# Patient Record
Sex: MALE | Race: WHITE | NOT HISPANIC OR LATINO | Employment: OTHER | URBAN - NONMETROPOLITAN AREA
[De-identification: names, ages, dates, MRNs, and addresses within clinical notes are randomized per-mention and may not be internally consistent; named-entity substitution may affect disease eponyms.]

---

## 2017-03-07 ENCOUNTER — OFFICE VISIT (OUTPATIENT)
Dept: CARDIOLOGY | Facility: CLINIC | Age: 72
End: 2017-03-07

## 2017-03-07 VITALS
SYSTOLIC BLOOD PRESSURE: 112 MMHG | BODY MASS INDEX: 35.76 KG/M2 | WEIGHT: 264 LBS | HEIGHT: 72 IN | HEART RATE: 68 BPM | DIASTOLIC BLOOD PRESSURE: 58 MMHG

## 2017-03-07 DIAGNOSIS — E11.9 TYPE 2 DIABETES MELLITUS WITHOUT COMPLICATION, WITHOUT LONG-TERM CURRENT USE OF INSULIN (HCC): ICD-10-CM

## 2017-03-07 DIAGNOSIS — J44.9 CHRONIC OBSTRUCTIVE PULMONARY DISEASE, UNSPECIFIED COPD TYPE (HCC): ICD-10-CM

## 2017-03-07 DIAGNOSIS — G47.30 SLEEP APNEA IN ADULT: ICD-10-CM

## 2017-03-07 DIAGNOSIS — I10 ESSENTIAL HYPERTENSION: Primary | ICD-10-CM

## 2017-03-07 DIAGNOSIS — E78.2 MIXED HYPERLIPIDEMIA: ICD-10-CM

## 2017-03-07 PROCEDURE — 99213 OFFICE O/P EST LOW 20 MIN: CPT | Performed by: NURSE PRACTITIONER

## 2017-03-07 NOTE — PROGRESS NOTES
Chief Complaint   Patient presents with   • Follow-up     He states has had one episode of chest pain, states related to lungs. PCP obtains labs about every 6 months. PCP writes refills on medication.   • Dizziness     Only if he gets up to fast. Has some swelling to face and left leg from time to time.       Subjective       Chet Beck is a 71 y.o. male  with a history of hypertension, COPD, and sleep apnea who had an abnormal echocardiogram being managed conservatively. At last few visits, repeat cardiac workup advised but patient declined as no new symptoms reported. Today he returns to the office for a follow up appointment and denies angina or palpitations. Some increase in shortness of breath and decreased effort tolerance reported he attributes to his lung disease. He is using daily oxygen.     HPI       Cardiac History:    Past Surgical History   Procedure Laterality Date   • Echo - converted  02/07/2012     EF 55% anterior WMA       Current Outpatient Prescriptions   Medication Sig Dispense Refill   • albuterol (PROVENTIL) (5 MG/ML) 0.5% nebulizer solution Take 2.5 mg by nebulization every 6 (six) hours as needed for wheezing.     • allopurinol (ZYLOPRIM) 300 MG tablet Take 300 mg by mouth daily.     • ALPRAZolam (XANAX) 1 MG tablet Take 1 mg by mouth 2 (two) times a day.     • atorvastatin (LIPITOR) 40 MG tablet Take 40 mg by mouth daily.     • cholecalciferol (VITAMIN D3) 1000 UNITS tablet Take 1,000 Units by mouth daily.     • fluticasone-salmeterol (ADVAIR) 500-50 MCG/DOSE DISKUS Inhale 2 (two) times a day.     • furosemide (LASIX) 40 MG tablet Take 40 mg by mouth Daily.     • HYDROcodone-acetaminophen (NORCO) 7.5-325 MG per tablet Take 1 tablet by mouth 3 (three) times a day as needed for moderate pain (4-6).     • isosorbide mononitrate (IMDUR) 60 MG 24 hr tablet Take 60 mg by mouth daily.     • metFORMIN XR (GLUCOPHAGE-XR) 500 MG 24 hr tablet 1/2 tablet by mouth once daily      • olmesartan  (BENICAR) 40 MG tablet Take 40 mg by mouth daily.     • omeprazole (PriLOSEC) 20 MG capsule Take 20 mg by mouth daily.     • OXYGEN-HELIUM IN Inhale. 2L continuous     • tamsulosin (FLOMAX) 0.4 MG capsule 24 hr capsule Take 1 capsule by mouth every night.     • tiotropium (SPIRIVA) 18 MCG per inhalation capsule Place 1 capsule into inhaler and inhale 1 (one) time daily.     • verapamil PM (VERELAN PM) 360 MG 24 hr capsule Take 360 mg by mouth every night.       No current facility-administered medications for this visit.        Codeine    Past Medical History   Diagnosis Date   • Anxiety    • Broken neck      Neck broke- 1982- fusion done.   • Colon abnormality      Colon (pockets) one started bleeding had to be cauterized- Dr. Baez.   • COPD (chronic obstructive pulmonary disease)      14-16 lung capacity left- Dr. Coleman following s/p O2 2L 24/7   • Diverticulitis      cauterized   • History of fusion of cervical spine    • Hyperlipidemia    • Hypertension    • Oxygen dependent      Has been on oxygen since 1998- quit smoking at that time   • Sleep apnea      CPAP   • Sleeping difficulties        Social History     Social History   • Marital status:      Spouse name: N/A   • Number of children: N/A   • Years of education: N/A     Occupational History   • Not on file.     Social History Main Topics   • Smoking status: Former Smoker     Quit date: 3/7/1997   • Smokeless tobacco: Never Used      Comment: quit several years ago   • Alcohol use No   • Drug use: No   • Sexual activity: Not on file     Other Topics Concern   • Not on file     Social History Narrative       Family History   Problem Relation Age of Onset   • COPD Mother    • Heart disease Father      Stent placement   • COPD Sister    • Heart attack Brother    • Hypertension Son        Review of Systems   Constitutional: Positive for activity change. Negative for appetite change and fever.   HENT: Negative.    Respiratory: Positive for cough and  "shortness of breath. Negative for choking, chest tightness and wheezing.    Cardiovascular: Positive for leg swelling (occassionally has lower leg edema).   Gastrointestinal: Negative.    Genitourinary: Negative.    Musculoskeletal: Positive for arthralgias. Negative for myalgias.   Neurological: Positive for dizziness (if get up to fast). Negative for facial asymmetry, speech difficulty and light-headedness.   Hematological: Does not bruise/bleed easily.   Psychiatric/Behavioral: Negative for confusion and sleep disturbance. The patient is not nervous/anxious.        Diabetes- Yes  Thyroid-normal    Objective     Visit Vitals   • /58 (BP Location: Left arm)   • Pulse 68   • Ht 72\" (182.9 cm)   • Wt 264 lb (120 kg)   • BMI 35.8 kg/m2       Physical Exam   Constitutional: He is oriented to person, place, and time.   Eyes: Pupils are equal, round, and reactive to light.   Neck: Neck supple. No JVD present.   Cardiovascular: Normal rate, regular rhythm and S1 normal.    Pulses:       Radial pulses are 2+ on the right side, and 2+ on the left side.   Slightly loud S2   Pulmonary/Chest: Effort normal. No respiratory distress. He has decreased breath sounds.   Abdominal: Soft. Bowel sounds are normal. He exhibits no distension. There is no tenderness.   Musculoskeletal: He exhibits no edema.   Neurological: He is alert and oriented to person, place, and time.   Skin: Skin is warm and dry. No pallor.   Psychiatric: His behavior is normal.   Vitals reviewed.      Procedures        Assessment/Plan      Chet was seen today for follow-up and dizziness.    Diagnoses and all orders for this visit:    Essential hypertension    Mixed hyperlipidemia    Chronic obstructive pulmonary disease, unspecified COPD type    Type 2 diabetes mellitus without complication, without long-term current use of insulin    Sleep apnea in adult        He follows with Dr. Coleman for pulmonary issues. From a cardiac standpoint he denies symptoms " and prefers to not have any cardiac testing unless symptoms or problems develop. His blood pressure and heart rate are normal. No medication changes recommended. His weight continues to increase and I encouraged him on decreased caloric intake. Activity is limited due to shortness of breath.   We will see him back in 6 months or sooner for problems.            Electronically signed by TONY Hernández,  March 7, 2017 1:20 PM

## 2017-09-11 ENCOUNTER — OFFICE VISIT (OUTPATIENT)
Dept: CARDIOLOGY | Facility: CLINIC | Age: 72
End: 2017-09-11

## 2017-09-11 VITALS
HEART RATE: 76 BPM | BODY MASS INDEX: 33.59 KG/M2 | WEIGHT: 248 LBS | SYSTOLIC BLOOD PRESSURE: 110 MMHG | DIASTOLIC BLOOD PRESSURE: 64 MMHG | HEIGHT: 72 IN

## 2017-09-11 DIAGNOSIS — I10 ESSENTIAL HYPERTENSION: ICD-10-CM

## 2017-09-11 DIAGNOSIS — E78.2 MIXED HYPERLIPIDEMIA: ICD-10-CM

## 2017-09-11 DIAGNOSIS — E11.9 TYPE 2 DIABETES MELLITUS WITHOUT COMPLICATION, WITHOUT LONG-TERM CURRENT USE OF INSULIN (HCC): ICD-10-CM

## 2017-09-11 DIAGNOSIS — R06.02 SHORTNESS OF BREATH: ICD-10-CM

## 2017-09-11 DIAGNOSIS — J44.9 COPD MIXED TYPE (HCC): Primary | ICD-10-CM

## 2017-09-11 PROCEDURE — 99213 OFFICE O/P EST LOW 20 MIN: CPT | Performed by: NURSE PRACTITIONER

## 2017-09-11 RX ORDER — ALPRAZOLAM 0.5 MG/1
0.5 TABLET ORAL 3 TIMES DAILY
COMMUNITY

## 2017-09-11 NOTE — PROGRESS NOTES
"Chief Complaint   Patient presents with   • Follow-up     cardiac management, labs per PCP, patient brought medication list with visit.    • Shortness of Breath     \"with activity and worse some days than other's\" wears oxygen 2 liters via NC continuously.       Cardiac Complaints  none      Subjective   Chet Beck is a 72 y.o. male with a history of hypertension, COPD, and sleep apnea who had an abnormal echocardiogram in 2012 being managed conservatively. At last few visits, repeat cardiac workup advised but patient has declined as no new symptoms reported. He returns today for follow up and denies any new concerns.  He does continue to have shortness of breath, but states it is no worse than prior, and attributes to his COPD.  He continues to wear 2LNC 24 hours a day and uses CPAP at night. Dr. Coleman is following in regards. Labs he reports with PCP and states he does not have report from most recent.  No refills are needed today as he reports with PCP.      Cardiac History  Past Surgical History:   Procedure Laterality Date   • ECHO - CONVERTED  02/07/2012    EF 55% anterior WMA       Current Outpatient Prescriptions   Medication Sig Dispense Refill   • albuterol (PROVENTIL) (5 MG/ML) 0.5% nebulizer solution Take 2.5 mg by nebulization every 6 (six) hours as needed for wheezing.     • allopurinol (ZYLOPRIM) 300 MG tablet Take 300 mg by mouth daily.     • ALPRAZolam (XANAX) 0.5 MG tablet Take 0.5 mg by mouth 3 (Three) Times a Day.     • atorvastatin (LIPITOR) 40 MG tablet Take 40 mg by mouth daily.     • cholecalciferol (VITAMIN D3) 1000 UNITS tablet Take 1,000 Units by mouth daily.     • fluticasone-salmeterol (ADVAIR) 500-50 MCG/DOSE DISKUS Inhale 2 (two) times a day.     • furosemide (LASIX) 40 MG tablet Take 40 mg by mouth Daily.     • HYDROcodone-acetaminophen (NORCO) 7.5-325 MG per tablet Take 1 tablet by mouth 3 (three) times a day as needed for moderate pain (4-6).     • isosorbide mononitrate (IMDUR) " 60 MG 24 hr tablet Take 60 mg by mouth daily.     • metFORMIN XR (GLUCOPHAGE-XR) 500 MG 24 hr tablet 1/2 tablet by mouth once daily      • olmesartan (BENICAR) 40 MG tablet Take 40 mg by mouth daily.     • omeprazole (PriLOSEC) 20 MG capsule Take 20 mg by mouth daily.     • OXYGEN-HELIUM IN Inhale. 2L continuous     • tamsulosin (FLOMAX) 0.4 MG capsule 24 hr capsule Take 1 capsule by mouth every night.     • tiotropium (SPIRIVA) 18 MCG per inhalation capsule Place 1 capsule into inhaler and inhale 1 (one) time daily.     • verapamil PM (VERELAN PM) 360 MG 24 hr capsule Take 360 mg by mouth every night.       No current facility-administered medications for this visit.        Codeine    Past Medical History:   Diagnosis Date   • Anxiety    • Broken neck     Neck broke- 1982- fusion done.   • Colon abnormality     Colon (pockets) one started bleeding had to be cauterized- Dr. Baez.   • COPD (chronic obstructive pulmonary disease)     14-16 lung capacity left- Dr. Coleman following s/p O2 2L 24/7   • Diverticulitis     cauterized   • History of fusion of cervical spine    • Hyperlipidemia    • Hypertension    • Oxygen dependent     Has been on oxygen since 1998- quit smoking at that time   • Sleep apnea     CPAP   • Sleeping difficulties        Social History     Social History   • Marital status:      Spouse name: N/A   • Number of children: N/A   • Years of education: N/A     Occupational History   • Not on file.     Social History Main Topics   • Smoking status: Former Smoker     Quit date: 3/7/1997   • Smokeless tobacco: Never Used      Comment: quit several years ago   • Alcohol use No   • Drug use: No   • Sexual activity: Not on file     Other Topics Concern   • Not on file     Social History Narrative       Family History   Problem Relation Age of Onset   • COPD Mother    • Heart disease Father      Stent placement   • COPD Sister    • Heart attack Brother    • Hypertension Son        Review of Systems  "  Constitution: Negative for weakness and malaise/fatigue.   Cardiovascular: Negative for chest pain, dyspnea on exertion, irregular heartbeat, near-syncope, palpitations and syncope.   Respiratory: Negative for shortness of breath and wheezing.    Musculoskeletal: Negative for arthritis and back pain.   Gastrointestinal: Negative for anorexia, heartburn and nausea.   Genitourinary: Negative for dysuria, hematuria, hesitancy and nocturia.   Neurological: Negative for dizziness, focal weakness and light-headedness.   Psychiatric/Behavioral: Negative for altered mental status and depression.       DiabetesYes  Thyroidnormal    Objective     /64 (BP Location: Left arm)  Pulse 76  Ht 72\" (182.9 cm)  Wt 248 lb (112 kg)  BMI 33.63 kg/m2    Physical Exam   Constitutional: He is oriented to person, place, and time. He appears well-developed and well-nourished.   HENT:   Head: Normocephalic and atraumatic.   Eyes: EOM are normal. Pupils are equal, round, and reactive to light.   Neck: Normal range of motion. Neck supple.   Cardiovascular: Normal rate and regular rhythm.    Pulmonary/Chest: Effort normal. He has wheezes.   Abdominal: Soft. Bowel sounds are normal.   Musculoskeletal: Normal range of motion.   Neurological: He is alert and oriented to person, place, and time.   Skin: Skin is warm and dry.   Psychiatric: He has a normal mood and affect. His behavior is normal.       Procedures    Assessment/Plan     HR and BP are both stable today.  No changes to current medication regimen will be advised.  We did discuss repeat current cardiac testing with patient since he continues to have shortness of breath but he declines as he feels like this is stable. Labs are done with your office, could I get most recent copy for our records?  No refills are needed as they are filled with your office.  He was encouraged to continue use of oxygen 2LNC daily and uses his CPAP with oxygen at night, he follows with Dr. Coleman in " regards.  Good cardiac diet with activity as tolerated advised.  He was praised for recent weight loss efforts as he has changed his dietary habits and is trying to eat earlier in the day.  He has lost about 15 pounds since last visit.  6 month follow up advised with patient advised to call with concerns if appointment should be needed.      Problems Addressed this Visit        Cardiovascular and Mediastinum    HTN (hypertension)    Hyperlipidemia       Endocrine    Diabetes      Other Visit Diagnoses     COPD mixed type    -  Primary    Shortness of breath                      Electronically signed by TONY Liang September 11, 2017 4:20 PM

## 2018-01-01 ENCOUNTER — OFFICE VISIT (OUTPATIENT)
Dept: CARDIOLOGY | Facility: CLINIC | Age: 73
End: 2018-01-01

## 2018-01-01 VITALS
HEART RATE: 76 BPM | BODY MASS INDEX: 31.15 KG/M2 | SYSTOLIC BLOOD PRESSURE: 110 MMHG | WEIGHT: 230 LBS | DIASTOLIC BLOOD PRESSURE: 64 MMHG | HEIGHT: 72 IN

## 2018-01-01 VITALS
SYSTOLIC BLOOD PRESSURE: 122 MMHG | HEIGHT: 72 IN | HEART RATE: 80 BPM | WEIGHT: 239 LBS | DIASTOLIC BLOOD PRESSURE: 58 MMHG | BODY MASS INDEX: 32.37 KG/M2

## 2018-01-01 DIAGNOSIS — Z79.899 MEDICATION MANAGEMENT: ICD-10-CM

## 2018-01-01 DIAGNOSIS — E78.2 MIXED HYPERLIPIDEMIA: ICD-10-CM

## 2018-01-01 DIAGNOSIS — R06.02 SHORTNESS OF BREATH: ICD-10-CM

## 2018-01-01 DIAGNOSIS — J44.9 CHRONIC OBSTRUCTIVE PULMONARY DISEASE, UNSPECIFIED COPD TYPE (HCC): ICD-10-CM

## 2018-01-01 DIAGNOSIS — I10 ESSENTIAL HYPERTENSION: Primary | ICD-10-CM

## 2018-01-01 DIAGNOSIS — E11.9 TYPE 2 DIABETES MELLITUS WITHOUT COMPLICATION, WITHOUT LONG-TERM CURRENT USE OF INSULIN (HCC): ICD-10-CM

## 2018-01-01 PROCEDURE — 99213 OFFICE O/P EST LOW 20 MIN: CPT | Performed by: NURSE PRACTITIONER

## 2018-03-12 NOTE — PROGRESS NOTES
Chief Complaint   Patient presents with   • Follow-up     For cardiac management. Last labs 1/21/18 per PCP. PCP writes refills on medication.   • Shortness of Breath     He reports about the same, continues to wear O2 at 3L/M [facility] and wears CPAP. He follows with Dr Coleman.       Subjective       Chet Beck is a 72 y.o. male with a history of hypertension, COPD, and sleep apnea who had an abnormal echocardiogram in 2012 being managed conservatively. At last few visits, repeat cardiac workup has been recommended, but he has declined since no new symptoms reported.  He came in today for his follow up visit.  He continues to have shortness of breath but feels this is stable and unchanged.  He continues to wear 2LNC 24 hours a day and uses CPAP at night.  Lately he has increased to 3L when needed.  Dr. Coleman is following the COPD.  Labs have been managed with Dr. Baez with most recent check in January 2018.  He denies any chest pain.    HPI         Cardiac History:    Past Surgical History:   Procedure Laterality Date   • ECHO - CONVERTED  02/07/2012    EF 55% anterior WMA       Current Outpatient Prescriptions   Medication Sig Dispense Refill   • albuterol (PROVENTIL) (5 MG/ML) 0.5% nebulizer solution Take 2.5 mg by nebulization every 6 (six) hours as needed for wheezing.     • allopurinol (ZYLOPRIM) 300 MG tablet Take 300 mg by mouth daily.     • ALPRAZolam (XANAX) 0.5 MG tablet Take 0.5 mg by mouth 3 (Three) Times a Day.     • atorvastatin (LIPITOR) 40 MG tablet Take 40 mg by mouth daily.     • cholecalciferol (VITAMIN D3) 1000 UNITS tablet Take 1,000 Units by mouth daily.     • fluticasone-salmeterol (ADVAIR) 500-50 MCG/DOSE DISKUS Inhale 2 (two) times a day.     • furosemide (LASIX) 40 MG tablet Take 40 mg by mouth Daily.     • HYDROcodone-acetaminophen (NORCO) 7.5-325 MG per tablet Take 1 tablet by mouth 3 (three) times a day as needed for moderate pain (4-6).     • isosorbide mononitrate (IMDUR) 60 MG 24 hr  tablet Take 60 mg by mouth daily.     • metFORMIN XR (GLUCOPHAGE-XR) 500 MG 24 hr tablet 1/2 tablet by mouth once daily      • olmesartan (BENICAR) 40 MG tablet Take 40 mg by mouth daily.     • omeprazole (PriLOSEC) 20 MG capsule Take 20 mg by mouth daily.     • OXYGEN-HELIUM IN Inhale. 2L continuous     • tamsulosin (FLOMAX) 0.4 MG capsule 24 hr capsule Take 1 capsule by mouth every night.     • tiotropium (SPIRIVA) 18 MCG per inhalation capsule Place 1 capsule into inhaler and inhale 1 (one) time daily.     • verapamil PM (VERELAN PM) 360 MG 24 hr capsule Take 360 mg by mouth every night.       No current facility-administered medications for this visit.        Codeine    Past Medical History:   Diagnosis Date   • Anxiety    • Broken neck     Neck broke- 1982- fusion done.   • Colon abnormality     Colon (pockets) one started bleeding had to be cauterized- Dr. Baez.   • COPD (chronic obstructive pulmonary disease)     14-16 lung capacity left- Dr. Coleman following s/p O2 2L 24/7   • Diverticulitis     cauterized   • History of fusion of cervical spine    • Hyperlipidemia    • Hypertension    • Oxygen dependent     Has been on oxygen since 1998- quit smoking at that time   • Sleep apnea     CPAP   • Sleeping difficulties        Social History     Social History   • Marital status:      Spouse name: N/A   • Number of children: N/A   • Years of education: N/A     Occupational History   • Not on file.     Social History Main Topics   • Smoking status: Former Smoker     Quit date: 3/7/1997   • Smokeless tobacco: Never Used      Comment: quit several years ago   • Alcohol use No   • Drug use: No   • Sexual activity: Not on file     Other Topics Concern   • Not on file     Social History Narrative   • No narrative on file       Family History   Problem Relation Age of Onset   • COPD Mother    • Heart disease Father      Stent placement   • COPD Sister    • Heart attack Brother    • Hypertension Son        Review  "of Systems   Constitution: Positive for weakness and weight loss (9 lb). Negative for night sweats.   HENT: Negative.    Eyes: Negative.    Cardiovascular: Positive for dyspnea on exertion. Negative for chest pain, leg swelling, orthopnea, paroxysmal nocturnal dyspnea and syncope.   Respiratory: Positive for cough (occasional), shortness of breath and wheezing.    Endocrine: Negative.    Hematologic/Lymphatic: Negative for bleeding problem. Does not bruise/bleed easily.   Skin: Negative.    Musculoskeletal: Negative for falls and myalgias.   Gastrointestinal: Negative for abdominal pain, melena and nausea.   Genitourinary: Negative for dysuria and hematuria.   Neurological: Negative for dizziness.        Diabetes- Yes  Thyroid-normal, no recent labs available     Objective     /58 (BP Location: Right arm)   Pulse 80   Ht 182.9 cm (72.01\")   Wt 108 kg (239 lb)   BMI 32.41 kg/m²     Physical Exam   Constitutional: He is oriented to person, place, and time. He appears well-developed and well-nourished.   HENT:   Head: Normocephalic.   Eyes: Pupils are equal, round, and reactive to light.   Neck: Normal range of motion.   Cardiovascular: Normal rate, regular rhythm and intact distal pulses.    Murmur heard.  Pulmonary/Chest: Effort normal. No respiratory distress. He has decreased breath sounds. He has no wheezes.   Abdominal: Soft. Bowel sounds are normal.   Musculoskeletal: Normal range of motion. He exhibits no edema.   Neurological: He is alert and oriented to person, place, and time.   Skin: Skin is warm and dry.   Psychiatric: He has a normal mood and affect.   Nursing note and vitals reviewed.     Procedures          Assessment/Plan    Heart rate and blood pressure stable.  Echocardiogram last checked in 2012 showed normal LV function.  He has declined any further work up.  He remains asymptomatic without cardiac symptoms and prefers medical management.  Continue long-acting nitrates, high intensity " "statin, ARB, CCB, and diuretic.  He may benefit with the addition of low dose aspirin if not contraindicated.  He will discuss with you.  He was encouraged to discuss oxygen with Dr. Coleman and avoid using more than recommended.  He does remain fairly active stating, \"I take long drives every day and listen to music.\"  He was encouraged to follow heart healthy diet low in saturated fat and sodium.  Body mass index is 32.41 kg/m².  No further recommendations unless he develops more symptoms.  We will see him back in six months.        Chet was seen today for follow-up and shortness of breath.    Diagnoses and all orders for this visit:    Essential hypertension    Mixed hyperlipidemia    Chronic obstructive pulmonary disease, unspecified COPD type    Type 2 diabetes mellitus without complication, without long-term current use of insulin                    Electronically signed by TONY Putnam,  March 13, 2018 10:34 PM  "

## 2018-09-11 NOTE — PROGRESS NOTES
Chief Complaint   Patient presents with   • Follow-up     For cardiac management. Labs per PCP about 2 months ago.    • Med Refill     PCP refills meds.        Subjective       Chet Beck is a 73 y.o. male  with a history of hypertension, COPD, and sleep apnea, follows with Dr. Coleman,  who had an abnormal echocardiogram in 2012 being managed conservatively. At last few visits, repeat cardiac workup has been discussed, but he has declined since no new symptoms reported. At last office visit low dose aspirin therapy was advised.   Today he comes to the office for a follow up visit and no cardiac complaints are voiced. He recently underwent cataract surgery without issue. His oxygen was increased to 3L which had improved his breathing. He did not start aspirin therapy as he tends to bruise easily and prefers not add medication.     HPI     Cardiac History:    Past Surgical History:   Procedure Laterality Date   • ECHO - CONVERTED  02/07/2012    EF 55% anterior WMA       Current Outpatient Prescriptions   Medication Sig Dispense Refill   • albuterol (PROVENTIL) (5 MG/ML) 0.5% nebulizer solution Take 2.5 mg by nebulization every 6 (six) hours as needed for wheezing.     • allopurinol (ZYLOPRIM) 300 MG tablet Take 300 mg by mouth daily.     • ALPRAZolam (XANAX) 0.5 MG tablet Take 0.5 mg by mouth 3 (Three) Times a Day.     • atorvastatin (LIPITOR) 40 MG tablet Take 40 mg by mouth daily.     • cholecalciferol (VITAMIN D3) 1000 UNITS tablet Take 1,000 Units by mouth daily.     • fluticasone-salmeterol (ADVAIR) 500-50 MCG/DOSE DISKUS Inhale 2 (two) times a day.     • furosemide (LASIX) 40 MG tablet Take 40 mg by mouth Daily.     • HYDROcodone-acetaminophen (NORCO) 7.5-325 MG per tablet Take 1 tablet by mouth 3 (three) times a day as needed for moderate pain (4-6).     • isosorbide mononitrate (IMDUR) 60 MG 24 hr tablet Take 60 mg by mouth daily.     • metFORMIN XR (GLUCOPHAGE-XR) 500 MG 24 hr tablet 1/2 tablet by mouth  once daily      • olmesartan (BENICAR) 40 MG tablet Take 40 mg by mouth daily.     • omeprazole (PriLOSEC) 20 MG capsule Take 20 mg by mouth daily.     • OXYGEN-HELIUM IN Inhale. 3L continuous     • tamsulosin (FLOMAX) 0.4 MG capsule 24 hr capsule Take 1 capsule by mouth every night.     • tiotropium (SPIRIVA) 18 MCG per inhalation capsule Place 1 capsule into inhaler and inhale 1 (one) time daily.     • verapamil PM (VERELAN PM) 360 MG 24 hr capsule Take 360 mg by mouth every night.       No current facility-administered medications for this visit.        Codeine    Past Medical History:   Diagnosis Date   • Anxiety    • Broken neck (CMS/Formerly [facility])     Neck broke- 1982- fusion done.   • Colon abnormality     Colon (pockets) one started bleeding had to be cauterized- Dr. Baez.   • COPD (chronic obstructive pulmonary disease) (CMS/Formerly [facility])     14-16 lung capacity left- Dr. Coleman following s/p O2 2L 24/7   • Diverticulitis     cauterized   • History of fusion of cervical spine    • Hyperlipidemia    • Hypertension    • Oxygen dependent     Has been on oxygen since 1998- quit smoking at that time   • Sleep apnea     CPAP   • Sleeping difficulties        Social History     Social History   • Marital status:      Spouse name: N/A   • Number of children: N/A   • Years of education: N/A     Occupational History   • Not on file.     Social History Main Topics   • Smoking status: Former Smoker     Quit date: 3/7/1997   • Smokeless tobacco: Never Used      Comment: quit several years ago   • Alcohol use No   • Drug use: No   • Sexual activity: Not on file     Other Topics Concern   • Not on file     Social History Narrative   • No narrative on file       Family History   Problem Relation Age of Onset   • COPD Mother    • Heart disease Father         Stent placement   • COPD Sister    • Heart attack Brother    • Hypertension Son        Review of Systems   Constitution: Positive for weakness (poor effort tolerance due to  "respiratory issues) and weight loss (intentional). Negative for decreased appetite.   HENT: Negative for congestion and nosebleeds.    Eyes: Negative for redness and visual disturbance.   Cardiovascular: Positive for dyspnea on exertion. Negative for chest pain, leg swelling, near-syncope and palpitations.   Respiratory: Positive for shortness of breath. Negative for cough and sputum production.    Endocrine: Negative for polydipsia, polyphagia and polyuria.   Hematologic/Lymphatic: Negative for bleeding problem. Bruises/bleeds easily.   Skin: Negative for dry skin and itching.   Musculoskeletal: Negative for falls and muscle cramps.   Gastrointestinal: Negative for abdominal pain, melena and nausea.   Genitourinary: Negative for dysuria and hematuria.   Neurological: Negative for dizziness and headaches.   Psychiatric/Behavioral: Negative for memory loss. The patient does not have insomnia.         Objective     /64   Pulse 76   Ht 182.9 cm (72\")   Wt 104 kg (230 lb)   BMI 31.19 kg/m²     Physical Exam   Constitutional: He is oriented to person, place, and time. He appears well-nourished.   HENT:   Head: Normocephalic.   Eyes: Pupils are equal, round, and reactive to light. Conjunctivae are normal.   Neck: Normal range of motion. Neck supple.   Cardiovascular: Normal rate, regular rhythm and S1 normal.    No murmur heard.  Pulses:       Radial pulses are 2+ on the right side, and 2+ on the left side.   Slightly loud S2   Pulmonary/Chest: No respiratory distress. He has decreased breath sounds. He has no wheezes. He has no rhonchi. He has no rales.   Abdominal: Soft. Bowel sounds are normal. He exhibits no distension. There is no tenderness.   Musculoskeletal: Normal range of motion.   Neurological: He is alert and oriented to person, place, and time.   Skin: Skin is warm and dry. No pallor.   Psychiatric: He has a normal mood and affect. His behavior is normal.        Procedures: none today    "     Assessment/Plan      Chet was seen today for follow-up and med refill.    Diagnoses and all orders for this visit:    Essential hypertension    Mixed hyperlipidemia    Chronic obstructive pulmonary disease, unspecified COPD type (CMS/HCC)    Medication management    Shortness of breath      Patient's Body mass index is 31.19 kg/m². BMI is above normal parameters. Recommendations include: nutrition counseling. I complimented his weight loss and encouraged him to continue diet efforts.     For cholesterol management he continues Lipitor without issue. He will follow with PCP for management of labs and further recommendations based on results.      His blood pressure, heart rate and rhythm are normal. Continue CCB, ARB and nitrate. No refills needed today.    Given he remains stable in regards to cardiac symptoms, no further testing done at this time. If cardiac concerns develop, he agrees to call. Otherwise, we will see him back in 6 months.     For pulmonary management, he follows with Dr. Burr. He is using daily oxygen as prescribed with benefit.            Electronically signed by TONY Hernández,  September 11, 2018 12:42 PM